# Patient Record
Sex: MALE | Race: WHITE | Employment: OTHER | URBAN - METROPOLITAN AREA
[De-identification: names, ages, dates, MRNs, and addresses within clinical notes are randomized per-mention and may not be internally consistent; named-entity substitution may affect disease eponyms.]

---

## 2020-11-14 PROBLEM — I65.23 BILATERAL CAROTID ARTERY STENOSIS: Status: ACTIVE | Noted: 2020-11-14

## 2020-11-14 PROBLEM — I73.9 PAD (PERIPHERAL ARTERY DISEASE) (HCC): Status: ACTIVE | Noted: 2020-11-14

## 2020-11-17 PROBLEM — R60.0 LEG EDEMA: Status: ACTIVE | Noted: 2020-11-17

## 2022-09-17 ENCOUNTER — HOSPITAL ENCOUNTER (EMERGENCY)
Age: 72
Discharge: HOME | End: 2022-09-18
Attending: EMERGENCY MEDICINE
Payer: MEDICARE

## 2022-09-17 DIAGNOSIS — F10.920 ACUTE ALCOHOLIC INTOXICATION WITHOUT COMPLICATION (HCC): Primary | ICD-10-CM

## 2022-09-17 PROCEDURE — 99284 EMERGENCY DEPT VISIT MOD MDM: CPT

## 2022-09-17 ASSESSMENT — PAIN - FUNCTIONAL ASSESSMENT: PAIN_FUNCTIONAL_ASSESSMENT: NONE - DENIES PAIN

## 2022-09-18 ENCOUNTER — APPOINTMENT (OUTPATIENT)
Dept: CT IMAGING | Age: 72
End: 2022-09-18
Payer: MEDICARE

## 2022-09-18 VITALS
OXYGEN SATURATION: 97 % | WEIGHT: 290 LBS | RESPIRATION RATE: 16 BRPM | TEMPERATURE: 97.5 F | BODY MASS INDEX: 43.95 KG/M2 | DIASTOLIC BLOOD PRESSURE: 66 MMHG | SYSTOLIC BLOOD PRESSURE: 131 MMHG | HEART RATE: 59 BPM | HEIGHT: 68 IN

## 2022-09-18 LAB
ALCOHOL SCREEN SERUM: 0.3 %WT/VOL
GLUCOSE BLD-MCNC: 112 MG/DL (ref 70–99)

## 2022-09-18 PROCEDURE — G0480 DRUG TEST DEF 1-7 CLASSES: HCPCS

## 2022-09-18 PROCEDURE — 72125 CT NECK SPINE W/O DYE: CPT

## 2022-09-18 PROCEDURE — 2580000003 HC RX 258: Performed by: EMERGENCY MEDICINE

## 2022-09-18 PROCEDURE — 70450 CT HEAD/BRAIN W/O DYE: CPT

## 2022-09-18 PROCEDURE — 82962 GLUCOSE BLOOD TEST: CPT

## 2022-09-18 RX ORDER — DICYCLOMINE HYDROCHLORIDE 10 MG/ML
20 INJECTION INTRAMUSCULAR ONCE
Status: DISCONTINUED | OUTPATIENT
Start: 2022-09-18 | End: 2022-09-18

## 2022-09-18 RX ORDER — 0.9 % SODIUM CHLORIDE 0.9 %
500 INTRAVENOUS SOLUTION INTRAVENOUS ONCE
Status: COMPLETED | OUTPATIENT
Start: 2022-09-18 | End: 2022-09-18

## 2022-09-18 RX ADMIN — SODIUM CHLORIDE 500 ML: 9 INJECTION, SOLUTION INTRAVENOUS at 00:12

## 2022-09-18 ASSESSMENT — ENCOUNTER SYMPTOMS
NAUSEA: 0
SHORTNESS OF BREATH: 0
ABDOMINAL PAIN: 0
GASTROINTESTINAL NEGATIVE: 1
EYES NEGATIVE: 1
BOWEL INCONTINENCE: 0
RESPIRATORY NEGATIVE: 1

## 2022-09-18 NOTE — ED PROVIDER NOTES
The history is provided by the patient and the EMS personnel. Alcohol Intoxication  Similar prior episodes: yes    Severity:  Severe  Timing:  Constant  Progression:  Unchanged  Chronicity:  Recurrent  Suspected agents:  Alcohol  Associated symptoms: no abdominal pain, no agitation, no blackouts, no bladder incontinence, no bowel incontinence, no confusion, no hallucinations, no headaches, no loss of consciousness, no nausea, no palpitations, no seizures, no shortness of breath and no somnolence    Associated symptoms comment:  Patient slid off his bar stool and onto the floor. No reported head trauma by ems. Patient couldn't stand back up and since EMS and bar could not locate him a rides they brought him to ER    Review of Systems   Constitutional: Negative. HENT: Negative. Eyes: Negative. Respiratory: Negative. Negative for shortness of breath. Cardiovascular: Negative. Negative for palpitations. Gastrointestinal: Negative. Negative for abdominal pain, bowel incontinence and nausea. Genitourinary: Negative. Negative for bladder incontinence. Musculoskeletal: Negative. Skin: Negative. Neurological: Negative. Negative for seizures, loss of consciousness and headaches. Psychiatric/Behavioral:  Negative for agitation, confusion and hallucinations. All other systems reviewed and are negative. History reviewed. No pertinent family history. Social History     Socioeconomic History    Marital status:       Spouse name: Not on file    Number of children: Not on file    Years of education: Not on file    Highest education level: Not on file   Occupational History    Not on file   Tobacco Use    Smoking status: Former     Packs/day: 2.00     Years: 45.00     Pack years: 90.00     Types: Cigarettes     Quit date: 2019     Years since quittin.9    Smokeless tobacco: Never   Substance and Sexual Activity    Alcohol use: Not Currently    Drug use: Never    Sexual activity: Not on file   Other Topics Concern    Not on file   Social History Narrative    Not on file     Social Determinants of Health     Financial Resource Strain: Not on file   Food Insecurity: Not on file   Transportation Needs: Not on file   Physical Activity: Not on file   Stress: Not on file   Social Connections: Not on file   Intimate Partner Violence: Not on file   Housing Stability: Not on file     Past Surgical History:   Procedure Laterality Date    CATARACT REMOVAL Bilateral      Past Medical History:   Diagnosis Date    Arthritis     CAD (coronary artery disease)     [facility]     Depression     Diabetes mellitus ([facility]     Gout     Hypertension     Kidney disease      No Known Allergies  Prior to Admission medications    Medication Sig Start Date End Date Taking?  Authorizing Provider   umeclidinium-vilanterol (ANORO ELLIPTA) 62.5-25 MCG/INH AEPB inhaler Inhale 1 puff into the lungs daily    Historical Provider, MD   Multiple Vitamin (MULTI-VITAMIN DAILY PO) Take 1 tablet by mouth daily    Historical Provider, MD   SUPER B COMPLEX/C PO Take 1 capsule by mouth daily    Historical Provider, MD   Cholecalciferol (VITAMIN D3) 1.25 MG (10276 UT) CAPS Take by mouth    Historical Provider, MD   calcium carbonate (OSCAL) 500 MG TABS tablet Take 500 mg by mouth daily    Historical Provider, MD   acetaminophen (TYLENOL) 500 MG tablet Take 500 mg by mouth every 6 hours as needed for Pain    Historical Provider, MD   melatonin 3 MG TABS tablet Take 3 mg by mouth daily    Historical Provider, MD   magnesium (MAGNESIUM-OXIDE) 250 MG TABS tablet Take 250 mg by mouth daily    Historical Provider, MD   insulin glargine (LANTUS) 100 UNIT/ML injection vial Inject into the skin nightly    Historical Provider, MD   albuterol (PROVENTIL) (2.5 MG/3ML) 0.083% nebulizer solution Take 2.5 mg by nebulization every 6 hours as needed for Wheezing    Historical Provider, MD 5. GCS motor subscore is 6. Comments: Intoxicated. MDM:    Labs Reviewed   ETHANOL - Abnormal; Notable for the following components:       Result Value    Alcohol Scrn 0.30 (*)     All other components within normal limits   POCT GLUCOSE - Abnormal; Notable for the following components:    POC Glucose 112 (*)     All other components within normal limits       CT Head W/O Contrast   Final Result   No acute intracranial abnormality. CT CERVICAL SPINE WO CONTRAST   Final Result   No acute abnormality of the cervical spine. Patient has no obvious injuries. He was given IVF and monitored. His blood alcohol is 0.33 and he will be monitored until he ahs sobered up and can call for a ride. The patient will be checked out to am physician. Due to the inadequacies of the Epic computer system. My note opened in same panel as the other physician the note below is his note and not mine I checked this patient out in no acute distress and stable. Final Impression    1. Acute alcoholic intoxication without complication ([address]              Patient endorsed to me by the night team.    26-year-old male who presented to the ED inebriated after a bout of drinks. Physical examination does not endorse any signs of injury. Patient is stable hemodynamically. Patient has elevated alcohol levels. Patient presents after sobriety or until sober adult is going to pick patient up.    9:20 AM  Patient's niece is yet to take patient off. Patient is unable to walk at this time. Further information from the history indicative of a possible head injury. Patient said to have been at a bar last night and there are reports of him having a fall with possible head injury. Patient does not remember the incident. Patient scheduled for CT scan of the head.          [address], DO  09/18/22 [address] [address], DO  09/18/22 1904

## 2022-09-18 NOTE — ED NOTES
Assisted pt to wheel chair. Pt very unsteady on feet. Pt assisted to toilet pt still unsteady on feet. Assisted pt back to room for Physician to re evaluate.      Zena Verduzco RN  09/18/22 [id number]